# Patient Record
(demographics unavailable — no encounter records)

---

## 2025-02-07 NOTE — PHYSICAL EXAM
[Normal] : mucosa is normal [Midline] : trachea located in midline position [] : septum deviated to the left [de-identified] : left pinnae post op reconstruction from dog bite [de-identified] : moderate hypertrophy [de-identified] : poor dentition [de-identified] : elongated palate

## 2025-02-07 NOTE — HISTORY OF PRESENT ILLNESS
[de-identified] : 64 y/o M referred by his primary care provider Dr. Kelvin Hall for chronic dizziness in the mornings x1 year and obstructive sleep apnea. His daughter on the phone during visit.   Prior chart notes from his PCP provided by patient and reviewed. Dr. Hall reports that he has evaluated for cause of dizziness several times without clear cause and is now referring to ENT. Unsure if cardiac evaluation. Reports symptoms of imbalance for several minutes after getting out of bed in the mornings for the last year. Denies lightheadedness, LOC, or room-spinning. No associated hearing change, tinnitus, or aural fullness. No FH dizziness or vertigo. No prior brain imaging that he is aware of. 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   He also reports having prior sleep studies at [facility] a couple months ago. Reportedly a CPAP was recommended but there was an insurance issue and he thought he was here to figure this out. Heavy snoring. Witnessed apneas. No nasal breathing issues. History of hypertension.

## 2025-02-07 NOTE — ASSESSMENT
[FreeTextEntry1] : dizziness -audiogram/tympanogram -vng -empiric vestibular therapy  snoring, witnessed apnea -sleep apnea on prior sleep study, does not have them for review, having issues getting CPAP -offered repeat sleep study and sleep medicine referral  RTC to review the above results

## 2025-02-07 NOTE — REASON FOR VISIT
[Initial Consultation] : an initial consultation for [FreeTextEntry2] : dizziness in mornings and excessive snoring